# Patient Record
Sex: MALE | Race: WHITE
[De-identification: names, ages, dates, MRNs, and addresses within clinical notes are randomized per-mention and may not be internally consistent; named-entity substitution may affect disease eponyms.]

---

## 2019-11-08 ENCOUNTER — HOSPITAL ENCOUNTER (OUTPATIENT)
Dept: HOSPITAL 8 - STAR | Age: 74
Discharge: HOME | End: 2019-11-08
Attending: INTERNAL MEDICINE
Payer: MEDICARE

## 2019-11-08 DIAGNOSIS — I25.2: ICD-10-CM

## 2019-11-08 DIAGNOSIS — I10: ICD-10-CM

## 2019-11-08 DIAGNOSIS — I35.0: Primary | ICD-10-CM

## 2019-11-08 DIAGNOSIS — Z98.61: ICD-10-CM

## 2019-11-08 PROCEDURE — 85025 COMPLETE CBC W/AUTO DIFF WBC: CPT

## 2019-11-08 PROCEDURE — 80048 BASIC METABOLIC PNL TOTAL CA: CPT

## 2019-11-08 PROCEDURE — 36415 COLL VENOUS BLD VENIPUNCTURE: CPT

## 2019-11-21 ENCOUNTER — OFFICE VISIT (OUTPATIENT)
Dept: CARDIOTHORACIC SURGERY | Facility: MEDICAL CENTER | Age: 74
End: 2019-11-21
Payer: MEDICARE

## 2019-11-21 VITALS
SYSTOLIC BLOOD PRESSURE: 148 MMHG | HEART RATE: 65 BPM | BODY MASS INDEX: 25.46 KG/M2 | OXYGEN SATURATION: 96 % | DIASTOLIC BLOOD PRESSURE: 76 MMHG | WEIGHT: 181.88 LBS | HEIGHT: 71 IN | TEMPERATURE: 98.1 F

## 2019-11-21 DIAGNOSIS — I35.0 AORTIC VALVE STENOSIS, ETIOLOGY OF CARDIAC VALVE DISEASE UNSPECIFIED: ICD-10-CM

## 2019-11-21 PROCEDURE — 99205 OFFICE O/P NEW HI 60 MIN: CPT | Performed by: THORACIC SURGERY (CARDIOTHORACIC VASCULAR SURGERY)

## 2019-11-21 ASSESSMENT — ENCOUNTER SYMPTOMS
NEUROLOGICAL NEGATIVE: 1
PSYCHIATRIC NEGATIVE: 1
ORTHOPNEA: 0
PALPITATIONS: 0
GASTROINTESTINAL NEGATIVE: 1
MYALGIAS: 1
EYES NEGATIVE: 1
SHORTNESS OF BREATH: 1

## 2019-11-21 NOTE — PROGRESS NOTES
REFERRING PHYSICIAN: Collin Conner MD.     CONSULTING PHYSICIAN: Adriano Mitchell MD, FACS.    CHIEF COMPLAINT: shortness of breath    HISTORY OF PRESENT ILLNESS: The patient is a 74 y.o. male the patient is a 74-year-old white male who is been followed by cardiology since 2006.  At that time he presented with an acute myocardial infarction and underwent a stent placement to his coronary artery.  He is been followed since that time with Dr. Collin conner with yearly echocardiograms.  Over the last year he has been complaining of increasing shortness of breath and dyspnea on exertion associated with moderate activity.  He denies syncope denies admission for congestive heart failure and denies palpitations.  The latest echocardiogram revealed severe aortic stenosis with a calculated valve area of 0.43 cm² and a peak gradient of 76 mmHg.  He is here today for evaluation of Sabir versus TAVR.    PAST MEDICAL HISTORY:   Past Medical History:   Diagnosis Date   • Coronary stent    • Hyperlipemia    Diabetes  Hypertension    PAST SURGICAL HISTORY:   None    FAMILY HISTORY:   No premature coronary artery disease.     SOCIAL HISTORY:   Social History     Socioeconomic History   • Marital status: Single     Spouse name: Not on file   • Number of children: Not on file   • Years of education: Not on file   • Highest education level: Not on file   Occupational History   • Not on file   Social Needs   • Financial resource strain: Not on file   • Food insecurity:     Worry: Not on file     Inability: Not on file   • Transportation needs:     Medical: Not on file     Non-medical: Not on file   Tobacco Use   • Smoking status: Never Smoker   Substance and Sexual Activity   • Alcohol use: No   • Drug use: No   • Sexual activity: Not on file   Lifestyle   • Physical activity:     Days per week: Not on file     Minutes per session: Not on file   • Stress: Not on file   Relationships   • Social connections:     Talks on phone: Not on file      Gets together: Not on file     Attends Mu-ism service: Not on file     Active member of club or organization: Not on file     Attends meetings of clubs or organizations: Not on file     Relationship status: Not on file   • Intimate partner violence:     Fear of current or ex partner: Not on file     Emotionally abused: Not on file     Physically abused: Not on file     Forced sexual activity: Not on file   Other Topics Concern   • Not on file   Social History Narrative   • Not on file       ALLERGIES:   Allergies   Allergen Reactions   • Nkda [No Known Drug Allergy]         CURRENT MEDICATIONS:     Current Outpatient Medications:   •  metFORMIN (GLUCOPHAGE) 500 MG Tab, , Disp: , Rfl:   •  simvastatin (ZOCOR) 40 MG TABS, Take 1 Tab by mouth every bedtime., Disp: 90 Each, Rfl: 3  •  metoprolol SR (TOPROL XL) 25 MG TB24, Take 1 Tab by mouth every day., Disp: 90 Each, Rfl: 3  •  enalapril (VASOTEC) 10 MG TABS, Take 1 Tab by mouth every day., Disp: 90 Each, Rfl: 6  •  niacin SR (NIASPAN) 500 MG TBCR, Take 1 Tab by mouth every day., Disp: 30 Each, Rfl: 6  •  aspirin EC (ECOTRIN) 325 MG TBEC, Take 325 mg by mouth every day., Disp: , Rfl:      LABS REVIEWED:  No results found for: SODIUM, POTASSIUM, CHLORIDE, CO2, GLUCOSE, BUN, CREATININE, BUNCREATRAT, GLOMRATE   No results found for: PROTHROMBTM, INR   No results found for: WBC, RBC, HEMOGLOBIN, HEMATOCRIT, MCV, MCH, MCHC, MPV, NEUTSPOLYS, LYMPHOCYTES, MONOCYTES, EOSINOPHILS, BASOPHILS, HYPOCHROMIA, ANISOCYTOSIS     IMAGING REVIEWED AND INTERPRETED:    ECHOCARDIOGRAM:   Moderate to severe aortic stenosis, peak gradient 80, vmax 4.1. Calculated valve area of 0.43 cm2.    ANGIOGRAM:   pending    REVIEW OF SYSTEMS:   Review of Systems   Constitutional: Positive for malaise/fatigue.   HENT: Negative.    Eyes: Negative.    Respiratory: Positive for shortness of breath.    Cardiovascular: Negative for chest pain, palpitations and orthopnea.   Gastrointestinal: Negative.   "  Genitourinary: Negative.    Musculoskeletal: Positive for myalgias.   Skin: Negative.    Neurological: Negative.    Endo/Heme/Allergies: Negative.    Psychiatric/Behavioral: Negative.      All other systems were reviewed with the patient and are negative.    PHYSICAL EXAMINATION:   CONSTITUTIONAL:  /76 (BP Location: Left arm, Patient Position: Sitting, BP Cuff Size: Adult)   Pulse 65   Temp 36.7 °C (98.1 °F) (Temporal)   Ht 1.803 m (5' 11\")   Wt 82.5 kg (181 lb 14.1 oz)   SpO2 96%     Physical Exam   Constitutional: He is oriented to person, place, and time and well-developed, well-nourished, and in no distress.   HENT:   Head: Normocephalic and atraumatic.   Eyes: Pupils are equal, round, and reactive to light.   Neck: Normal range of motion. Neck supple. No JVD present.   Cardiovascular: Normal rate and regular rhythm.   Murmur heard.  Pulmonary/Chest: Effort normal and breath sounds normal.   Abdominal: Soft. Bowel sounds are normal.   Musculoskeletal: Normal range of motion.         General: No edema.   Neurological: He is alert and oriented to person, place, and time.   Skin: Skin is warm and dry.   Psychiatric: Mood, memory, affect and judgment normal.     IMPRESSION:  Severe aortic stenosis (calcific/degenerative)    PLAN:  I recommend TAVR.    The STS mortality risk score is 1% and the morbidity and mortality risk score is 5%. The scores were discussed with patient.    Findings and recommendations have been discussed with the patient’s cardiologist Collin Smith.  Thank you for this very challenging consultation and participation in the patient’s care.  I will keep you apprised of all future developments.      Sincerely,       Adriano Mitchell MD, FACS.    "

## 2019-12-05 ENCOUNTER — HOSPITAL ENCOUNTER (OUTPATIENT)
Dept: HOSPITAL 8 - CVU | Age: 74
Discharge: HOME | End: 2019-12-05
Attending: INTERNAL MEDICINE
Payer: MEDICARE

## 2019-12-05 DIAGNOSIS — Z87.891: ICD-10-CM

## 2019-12-05 DIAGNOSIS — I70.0: ICD-10-CM

## 2019-12-05 DIAGNOSIS — I10: ICD-10-CM

## 2019-12-05 DIAGNOSIS — I65.23: Primary | ICD-10-CM

## 2019-12-05 DIAGNOSIS — I35.0: ICD-10-CM

## 2019-12-05 DIAGNOSIS — I70.203: ICD-10-CM

## 2019-12-05 DIAGNOSIS — E78.5: ICD-10-CM

## 2019-12-05 DIAGNOSIS — I25.2: ICD-10-CM

## 2019-12-05 PROCEDURE — 71275 CT ANGIOGRAPHY CHEST: CPT

## 2019-12-05 PROCEDURE — 93880 EXTRACRANIAL BILAT STUDY: CPT

## 2019-12-05 PROCEDURE — 94010 BREATHING CAPACITY TEST: CPT

## 2019-12-05 PROCEDURE — 94726 PLETHYSMOGRAPHY LUNG VOLUMES: CPT

## 2019-12-05 PROCEDURE — 94729 DIFFUSING CAPACITY: CPT

## 2019-12-05 PROCEDURE — 74174 CTA ABD&PLVS W/CONTRAST: CPT

## 2020-02-07 ENCOUNTER — HOSPITAL ENCOUNTER (OUTPATIENT)
Dept: HOSPITAL 8 - CVU | Age: 75
Discharge: HOME | End: 2020-02-07
Attending: INTERNAL MEDICINE
Payer: MEDICARE

## 2020-02-07 DIAGNOSIS — I11.9: ICD-10-CM

## 2020-02-07 DIAGNOSIS — E11.9: ICD-10-CM

## 2020-02-07 DIAGNOSIS — R06.02: ICD-10-CM

## 2020-02-07 DIAGNOSIS — I65.29: ICD-10-CM

## 2020-02-07 DIAGNOSIS — Z79.01: ICD-10-CM

## 2020-02-07 DIAGNOSIS — I34.0: Primary | ICD-10-CM

## 2020-02-07 DIAGNOSIS — I25.10: ICD-10-CM

## 2020-02-07 PROCEDURE — 93306 TTE W/DOPPLER COMPLETE: CPT

## 2020-02-07 PROCEDURE — 93356 MYOCRD STRAIN IMG SPCKL TRCK: CPT

## 2020-12-08 ENCOUNTER — HOSPITAL ENCOUNTER (OUTPATIENT)
Dept: HOSPITAL 8 - CFH | Age: 75
Discharge: HOME | End: 2020-12-08
Attending: INTERNAL MEDICINE
Payer: MEDICARE

## 2020-12-08 DIAGNOSIS — I11.9: ICD-10-CM

## 2020-12-08 DIAGNOSIS — I21.4: ICD-10-CM

## 2020-12-08 DIAGNOSIS — Z98.61: ICD-10-CM

## 2020-12-08 DIAGNOSIS — I34.0: Primary | ICD-10-CM

## 2020-12-08 PROCEDURE — 93306 TTE W/DOPPLER COMPLETE: CPT

## 2020-12-08 PROCEDURE — 93356 MYOCRD STRAIN IMG SPCKL TRCK: CPT

## 2020-12-23 ENCOUNTER — HOSPITAL ENCOUNTER (OUTPATIENT)
Dept: HOSPITAL 8 - STAR | Age: 75
Discharge: HOME | End: 2020-12-23
Attending: INTERNAL MEDICINE
Payer: MEDICARE

## 2020-12-23 DIAGNOSIS — Z20.828: Primary | ICD-10-CM

## 2020-12-23 PROCEDURE — 87635 SARS-COV-2 COVID-19 AMP PRB: CPT

## 2020-12-28 ENCOUNTER — HOSPITAL ENCOUNTER (OUTPATIENT)
Dept: HOSPITAL 8 - CACL | Age: 75
Discharge: HOME | End: 2020-12-28
Attending: INTERNAL MEDICINE
Payer: MEDICARE

## 2020-12-28 VITALS — WEIGHT: 180.36 LBS | BODY MASS INDEX: 25.82 KG/M2 | HEIGHT: 70 IN

## 2020-12-28 VITALS — DIASTOLIC BLOOD PRESSURE: 90 MMHG | SYSTOLIC BLOOD PRESSURE: 169 MMHG

## 2020-12-28 DIAGNOSIS — Z87.891: ICD-10-CM

## 2020-12-28 DIAGNOSIS — Z79.01: ICD-10-CM

## 2020-12-28 DIAGNOSIS — I25.2: ICD-10-CM

## 2020-12-28 DIAGNOSIS — Z79.82: ICD-10-CM

## 2020-12-28 DIAGNOSIS — I10: ICD-10-CM

## 2020-12-28 DIAGNOSIS — I25.10: ICD-10-CM

## 2020-12-28 DIAGNOSIS — Z95.5: ICD-10-CM

## 2020-12-28 DIAGNOSIS — Z79.899: ICD-10-CM

## 2020-12-28 DIAGNOSIS — I48.91: ICD-10-CM

## 2020-12-28 DIAGNOSIS — I34.0: Primary | ICD-10-CM

## 2020-12-28 DIAGNOSIS — Z79.02: ICD-10-CM

## 2020-12-28 DIAGNOSIS — E11.9: ICD-10-CM

## 2020-12-28 DIAGNOSIS — Z95.2: ICD-10-CM

## 2020-12-28 DIAGNOSIS — Z79.84: ICD-10-CM

## 2020-12-28 PROCEDURE — 93325 DOPPLER ECHO COLOR FLOW MAPG: CPT

## 2020-12-28 PROCEDURE — 93312 ECHO TRANSESOPHAGEAL: CPT

## 2020-12-28 PROCEDURE — 93321 DOPPLER ECHO F-UP/LMTD STD: CPT

## 2021-01-15 DIAGNOSIS — Z23 NEED FOR VACCINATION: ICD-10-CM

## 2021-04-07 ENCOUNTER — HOSPITAL ENCOUNTER (OUTPATIENT)
Dept: HOSPITAL 8 - CVU | Age: 76
Discharge: HOME | End: 2021-04-07
Attending: INTERNAL MEDICINE
Payer: MEDICARE

## 2021-04-07 DIAGNOSIS — I34.0: Primary | ICD-10-CM

## 2021-04-07 DIAGNOSIS — I11.9: ICD-10-CM

## 2021-04-07 PROCEDURE — 93356 MYOCRD STRAIN IMG SPCKL TRCK: CPT

## 2021-04-07 PROCEDURE — 93306 TTE W/DOPPLER COMPLETE: CPT

## 2021-07-12 ENCOUNTER — HOSPITAL ENCOUNTER (OUTPATIENT)
Dept: HOSPITAL 8 - CVU | Age: 76
Discharge: HOME | End: 2021-07-12
Attending: INTERNAL MEDICINE
Payer: MEDICARE

## 2021-07-12 DIAGNOSIS — I35.0: Primary | ICD-10-CM

## 2021-07-12 PROCEDURE — 93308 TTE F-UP OR LMTD: CPT

## 2021-07-12 PROCEDURE — 93321 DOPPLER ECHO F-UP/LMTD STD: CPT

## 2021-07-12 PROCEDURE — 93325 DOPPLER ECHO COLOR FLOW MAPG: CPT

## 2021-09-16 ENCOUNTER — HOSPITAL ENCOUNTER (OUTPATIENT)
Dept: HOSPITAL 8 - CFH | Age: 76
Discharge: HOME | End: 2021-09-16
Attending: PHYSICIAN ASSISTANT
Payer: MEDICARE

## 2021-09-16 DIAGNOSIS — I11.9: ICD-10-CM

## 2021-09-16 DIAGNOSIS — I34.0: Primary | ICD-10-CM

## 2021-09-16 PROCEDURE — 93306 TTE W/DOPPLER COMPLETE: CPT

## 2021-09-30 ENCOUNTER — HOSPITAL ENCOUNTER (OUTPATIENT)
Dept: HOSPITAL 8 - CVU | Age: 76
Discharge: HOME | End: 2021-09-30
Attending: INTERNAL MEDICINE
Payer: MEDICARE

## 2021-09-30 DIAGNOSIS — I25.2: ICD-10-CM

## 2021-09-30 DIAGNOSIS — I11.9: Primary | ICD-10-CM

## 2021-09-30 DIAGNOSIS — I35.0: ICD-10-CM

## 2021-09-30 DIAGNOSIS — I48.91: ICD-10-CM
